# Patient Record
Sex: FEMALE | Race: WHITE | NOT HISPANIC OR LATINO
[De-identification: names, ages, dates, MRNs, and addresses within clinical notes are randomized per-mention and may not be internally consistent; named-entity substitution may affect disease eponyms.]

---

## 2017-11-13 ENCOUNTER — APPOINTMENT (OUTPATIENT)
Dept: OTOLARYNGOLOGY | Facility: CLINIC | Age: 62
End: 2017-11-13
Payer: COMMERCIAL

## 2017-11-13 VITALS
DIASTOLIC BLOOD PRESSURE: 92 MMHG | WEIGHT: 138 LBS | BODY MASS INDEX: 25.4 KG/M2 | HEIGHT: 62 IN | HEART RATE: 52 BPM | SYSTOLIC BLOOD PRESSURE: 155 MMHG

## 2017-11-13 DIAGNOSIS — Z78.9 OTHER SPECIFIED HEALTH STATUS: ICD-10-CM

## 2017-11-13 DIAGNOSIS — Z87.820 PERSONAL HISTORY OF TRAUMATIC BRAIN INJURY: ICD-10-CM

## 2017-11-13 DIAGNOSIS — H93.A2 PULSATILE TINNITUS, LEFT EAR: ICD-10-CM

## 2017-11-13 DIAGNOSIS — H69.82 OTHER SPECIFIED DISORDERS OF EUSTACHIAN TUBE, LEFT EAR: ICD-10-CM

## 2017-11-13 DIAGNOSIS — H93.13 TINNITUS, BILATERAL: ICD-10-CM

## 2017-11-13 DIAGNOSIS — H61.812 EXOSTOSIS OF LEFT EXTERNAL CANAL: ICD-10-CM

## 2017-11-13 PROCEDURE — 92550 TYMPANOMETRY & REFLEX THRESH: CPT

## 2017-11-13 PROCEDURE — 99204 OFFICE O/P NEW MOD 45 MIN: CPT | Mod: 25

## 2017-11-13 PROCEDURE — 92557 COMPREHENSIVE HEARING TEST: CPT

## 2017-11-13 RX ORDER — LANSOPRAZOLE 15 MG/1
15 TABLET, ORALLY DISINTEGRATING, DELAYED RELEASE ORAL
Refills: 0 | Status: ACTIVE | COMMUNITY

## 2017-11-13 RX ORDER — ESTRADIOL 1 MG/1
1 TABLET ORAL
Refills: 0 | Status: ACTIVE | COMMUNITY

## 2017-11-13 RX ORDER — HYDROCHLOROTHIAZIDE 12.5 MG/1
12.5 CAPSULE ORAL
Refills: 0 | Status: ACTIVE | COMMUNITY

## 2019-04-01 ENCOUNTER — APPOINTMENT (OUTPATIENT)
Dept: VASCULAR SURGERY | Facility: CLINIC | Age: 64
End: 2019-04-01
Payer: COMMERCIAL

## 2019-04-01 PROCEDURE — 99203 OFFICE O/P NEW LOW 30 MIN: CPT

## 2019-04-01 PROCEDURE — 93971 EXTREMITY STUDY: CPT

## 2019-04-05 ENCOUNTER — APPOINTMENT (OUTPATIENT)
Age: 64
End: 2019-04-05
Payer: COMMERCIAL

## 2019-04-05 PROCEDURE — 93971 EXTREMITY STUDY: CPT

## 2019-04-15 NOTE — PHYSICAL EXAM
[Normal Heart Sounds] : normal heart sounds [Normal Breath Sounds] : Normal breath sounds [2+] : left 2+ [de-identified] : NAD.  [de-identified] : WNL [FreeTextEntry1] : Left upper extremity edema. Sensorimotor function intact. Hand is warm and well perfused.

## 2019-04-15 NOTE — HISTORY OF PRESENT ILLNESS
[FreeTextEntry1] : I have just had the pleasure of seeing Mrs. Rick in consultation for left upper extremity edema. Mrs. Rick is a 63 year old lady who presents with acute edema of the left arm, which she noted this morning. She is an active person and swims on a daily basis. She denies any arm coolness, skin color changes, paresthesia, or weakness. She denies any personal history of DVT, PE, SVT, or other thromboembolic disease. She has a family history of hypercoagulable disease (see below). She takes Estradiol s/p hysterectomy for fibroids.\par \par PMH: tinnitus, PCS, left shoulder injury/repair, HTN, cholecystectomy, SBR for obstruction, right knee surgery, GERD\par \par Meds: Estradiol, Prevacid, Ramipril\par \par All: Levaquin\par \par SH: non-smoker\par \par FH: factor V Leiden in sister and niece

## 2019-04-15 NOTE — ASSESSMENT
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 Mcgovern.\par She will be started on therapeutic lovenox today and will be sent for a CTA at [facility] to assess for PE and TOS. Dr. Mcgovern is to contact me with the results.

## 2019-05-02 ENCOUNTER — EMERGENCY (EMERGENCY)
Facility: HOSPITAL | Age: 64
LOS: 1 days | Discharge: HOME | End: 2019-05-02
Attending: EMERGENCY MEDICINE
Payer: COMMERCIAL

## 2019-05-02 VITALS
TEMPERATURE: 98 F | RESPIRATION RATE: 17 BRPM | HEIGHT: 62 IN | SYSTOLIC BLOOD PRESSURE: 170 MMHG | DIASTOLIC BLOOD PRESSURE: 109 MMHG | OXYGEN SATURATION: 96 % | HEART RATE: 75 BPM | WEIGHT: 138.89 LBS

## 2019-05-02 VITALS
OXYGEN SATURATION: 99 % | HEART RATE: 84 BPM | SYSTOLIC BLOOD PRESSURE: 174 MMHG | TEMPERATURE: 98 F | RESPIRATION RATE: 18 BRPM | DIASTOLIC BLOOD PRESSURE: 90 MMHG

## 2019-05-02 PROCEDURE — 99284 EMERGENCY DEPT VISIT MOD MDM: CPT | Mod: 25

## 2019-05-02 PROCEDURE — 70450 CT HEAD/BRAIN W/O DYE: CPT | Mod: 26

## 2019-05-02 PROCEDURE — 70450 CT HEAD/BRAIN W/O DYE: CPT

## 2019-05-02 PROCEDURE — 99284 EMERGENCY DEPT VISIT MOD MDM: CPT

## 2019-05-02 RX ORDER — ACETAMINOPHEN 500 MG
650 TABLET ORAL ONCE
Qty: 0 | Refills: 0 | Status: COMPLETED | OUTPATIENT
Start: 2019-05-02 | End: 2019-05-02

## 2019-05-02 RX ADMIN — Medication 650 MILLIGRAM(S): at 20:03

## 2019-05-02 RX ADMIN — Medication 650 MILLIGRAM(S): at 17:50

## 2019-05-02 NOTE — ED ADULT NURSE NOTE - NSIMPLEMENTINTERV_GEN_ALL_ED
Implemented All Universal Safety Interventions:  [place] to call system. Call bell, personal items and telephone within reach. Instruct patient to call for assistance. Room bathroom lighting operational. Non-slip footwear when patient is off stretcher. Physically safe environment: no spills, clutter or unnecessary equipment. Stretcher in lowest position, wheels locked, appropriate side rails in place.
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
## 2019-05-02 NOTE — ED ADULT NURSE NOTE - CHPI ED NUR SYMPTOMS NEG
no laceration/no fussiness/no loss of consciousness/no sleeping issues/no pain/no back pain/no bruising/no acting out behaviors/no crying/no decreased eating/drinking/no difficulty bearing weight/no disorientation/no dizziness
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
## 2019-05-02 NOTE — ED PROVIDER NOTE - ENMT, MLM
Airway patent, Nasal mucosa clear. Mouth with normal mucosa. Throat has no vesicles, no oropharyngeal exudates and uvula is midline.  scalp tender posterior, no lump
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
## 2019-05-02 NOTE — ED PROVIDER NOTE - PMH
Bradycardia    Cyst  removal cyst right knee  History of hysterectomy    Shoulder dislocation  left shoulder arthroscopy

## 2019-05-02 NOTE — ED ADULT TRIAGE NOTE - HEIGHT IN CM
Please call patient to arrange the previously ordered TSh any day.  I have responded to her gallbladder question in My chart.   [phone]
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
